# Patient Record
Sex: FEMALE | Race: WHITE | Employment: PART TIME | URBAN - METROPOLITAN AREA
[De-identification: names, ages, dates, MRNs, and addresses within clinical notes are randomized per-mention and may not be internally consistent; named-entity substitution may affect disease eponyms.]

---

## 2022-08-31 ENCOUNTER — OFFICE VISIT (OUTPATIENT)
Dept: ORTHOPEDIC SURGERY | Age: 32
End: 2022-08-31
Payer: COMMERCIAL

## 2022-08-31 VITALS — BODY MASS INDEX: 23.3 KG/M2 | HEIGHT: 66 IN | WEIGHT: 145 LBS

## 2022-08-31 DIAGNOSIS — M25.511 ACUTE PAIN OF RIGHT SHOULDER: ICD-10-CM

## 2022-08-31 DIAGNOSIS — S43.431A TEAR OF RIGHT GLENOID LABRUM, INITIAL ENCOUNTER: Primary | ICD-10-CM

## 2022-08-31 PROCEDURE — G8420 CALC BMI NORM PARAMETERS: HCPCS | Performed by: PHYSICIAN ASSISTANT

## 2022-08-31 PROCEDURE — 4004F PT TOBACCO SCREEN RCVD TLK: CPT | Performed by: PHYSICIAN ASSISTANT

## 2022-08-31 PROCEDURE — G8427 DOCREV CUR MEDS BY ELIG CLIN: HCPCS | Performed by: PHYSICIAN ASSISTANT

## 2022-08-31 PROCEDURE — 99203 OFFICE O/P NEW LOW 30 MIN: CPT | Performed by: PHYSICIAN ASSISTANT

## 2022-08-31 RX ORDER — ACETAMINOPHEN AND CODEINE PHOSPHATE 120; 12 MG/5ML; MG/5ML
SOLUTION ORAL
COMMUNITY
Start: 2022-08-29

## 2022-08-31 NOTE — PROGRESS NOTES
Date of Encounter: 8/31/2022  Patient Zoraida Jimenes Record Number: [id number]  YOB: 1990    Chief Complaint   Patient presents with    Shoulder Pain     RIght shoulder       History of Present Illness:  Nicolas Neff is a 34 y.o. female here for evaluation of her right shoulder. Her pain assessment is documented below and I reviewed this with her today. 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  She states about 2 months ago she began having right shoulder pain after a game. She states since that time she has had somewhat severe right shoulder pain every time she goes to throw a ball. She denies pain at rest.  She denies pain with gentle range of motion activities. She states the only time she really has pain is with throwing. She denies swelling, redness or warmth of the right shoulder. She has had no previous injury to the shoulder. She is right-handed. Pain Assessment  Location of Pain: Shoulder  Location Modifiers: Right, Superior, Posterior  Severity of Pain: 9  Quality of Pain: Sharp, Aching  Duration of Pain: A few minutes  Frequency of Pain: Other (Comment) (Only when throwing in softball)  Date Pain First Started:  (1.5 months ago)  Aggravating Factors: Other (Comment) (throwing softball)  Result of Injury: No  Work-Related Injury: No  Are there other pain locations you wish to document?: No    Past Medical History:   Diagnosis Date    Concussion        Past Surgical History:   Procedure Laterality Date    APPENDECTOMY      HERNIA REPAIR      KNEE SURGERY         Current Outpatient Medications   Medication Sig Dispense Refill    norethindrone (MICRONOR) 0.35 MG tablet        No current facility-administered medications for this visit. Allergies, social and family histories were reviewed and updated as appropriate.     Review of Systems:  Relevant review of systems reviewed and available in the patient's chart under the 'MEDIA' tab.    Vital Signs:  Ht 5' 6\" (1.676 m)   Wt 145 lb (65.8 kg)   BMI 23.40 kg/m²     General Exam:   Constitutional: Patient is adequately groomed with no evidence of malnutrition  Mental Status: The patient is oriented to time, place and person. The patient's mood and affect are appropriate. Lymphatic: The lymphatic examination bilaterally reveals all areas to be without enlargement or induration. Neurological: The patient has good coordination and balance. There is no focal weakness or sensory deficit. Right Shoulder Examination:    Inspection:  No ptosis and normal deltoid contour. No gross atrophy in any particular myotome. There is no obvious swelling or joint effusion of the shoulder. There are no abrasions, lacerations, contusions, hematomas or ecchymosis. There is no erythema, induration or warmth to suggest an infectious process. Palpation: Patient denies tenderness on palpation of the right shoulder    Range of Motion: Forward flexion: 180°    Abduction: 150°   External Rotation with elbow at Side: 90°   Internal rotation: T5    Strength: Forward flexion: 5 / 5                   Abduction: 5 / 5        Internal Rotation: 5 / 5        External Rotation with elbow at side: 5 / 5        Shoulder Shru/5    Special Tests:      Neer's impingement is negative  Menard's maneuver is negative  Empty can testing is positive  Hair's test is negative  Drop arm sign is negative   Sulcus sign: negative  No signs of any significant multidirectional instability. Elbow and wrist range of motion full with strength 5/5 on elbow flexion and extension, 5/5 on wrist flexion and extension.  strength is 5/5. Hand intrinsic function intact. Sensation light touch grossly present throughout the axillary, median, radial, ulnar, muscular cutaneous nerve distributions.     Cervical spine: No tenderness over the spinous processes or step-offs, normal flexion and extension, lateral rotation and bending without increasing pain or radicular symptoms. Spurling's sign negative. Skin: There are no rashes, ulcerations or lesions. Sensation to light touch intact. Circulation: The limb is warm and well perfused. Distal pulses intact. Capillary refill is intact. Edema: none. Venous stasis changes: none. Radiology:     X-rays obtained and reviewed in office:  Views: 3 (AP, Y, Axillary) right shoulder  Impression: No evidence for acute fracture or dislocation. No lytic or blastic areas in the proximal humeral metaphysis / diaphysis. Acromioclavicular and glenohumeral joints show no significant loss of joint space    Orders:  Orders Placed This Encounter   Procedures    XR SHOULDER RIGHT (MIN 2 VIEWS)     Standing Status:   Future     Number of Occurrences:   1     Standing Expiration Date:   9/30/2022    MRI SHOULDER RIGHT WO CONTRAST     Vickie Mcrae     Standing Status:   Future     Standing Expiration Date:   8/31/2023       Impression:   Diagnosis Orders   1. Tear of right glenoid labrum, initial encounter  MRI SHOULDER RIGHT WO CONTRAST      2. Acute pain of right shoulder  XR SHOULDER RIGHT (MIN 2 VIEWS)          Treatment Plan:          I am concerned for glenoid labrum pathology. X-rays are unremarkable. Patient has been trying rest, ice, heat, anti-inflammatories and Tylenol. She has been working with a  doing exercises and stretches without relief of her pain. I feel it would be best to further evaluate the right shoulder with MRI. Advised patient I would review MRI once completed and refer if appropriate. Renuka Anton was informed of the results of any imaging. We discussed treatment options and a time was given to answer questions. A plan was proposed and Renuka Anton understand and accepts this course of care.           Electronically signed by Alyce Son PA-C on 0/99/1370  Board Certified [facility]    Please note that portions of this dictation was performed with a voice recognition program ([employer] [place] [place] ([employer] All efforts were made to edit the dictation but occasionally words are mis-transcribed. It is possible that there are still dictated errors within this office note.   If so, please bring any errors to my attention for an addendum

## 2022-10-10 ENCOUNTER — HOSPITAL ENCOUNTER (OUTPATIENT)
Dept: MRI IMAGING | Age: 32
Discharge: HOME | End: 2022-10-10
Payer: COMMERCIAL

## 2022-10-10 DIAGNOSIS — S43.431A TEAR OF RIGHT GLENOID LABRUM, INITIAL ENCOUNTER: ICD-10-CM

## 2022-10-10 PROCEDURE — 73221 MRI JOINT UPR EXTREM W/O DYE: CPT

## 2022-10-17 ENCOUNTER — TELEPHONE (OUTPATIENT)
Dept: ORTHOPEDIC SURGERY | Age: 32
End: 2022-10-17

## 2022-10-17 NOTE — TELEPHONE ENCOUNTER
Can you help patient schedule an appointment with Dr. Brie Neri to discuss MRI results and treatment options?  Thanks

## 2022-10-17 NOTE — TELEPHONE ENCOUNTER
Called patient 2nd time and left message Ronandionisio Sayra would like her to see Dr Bea Calvo for MRI results -- left # for patient to call back and schedule appt with Dr Bea Calvo

## 2022-10-21 ENCOUNTER — OFFICE VISIT (OUTPATIENT)
Dept: ORTHOPEDIC SURGERY | Age: 32
End: 2022-10-21
Payer: COMMERCIAL

## 2022-10-21 VITALS — HEIGHT: 66 IN | WEIGHT: 145 LBS | BODY MASS INDEX: 23.3 KG/M2

## 2022-10-21 DIAGNOSIS — M75.41 INTERNAL IMPINGEMENT OF RIGHT SHOULDER: Primary | ICD-10-CM

## 2022-10-21 DIAGNOSIS — S43.431A DEGENERATIVE SUPERIOR LABRAL ANTERIOR-TO-POSTERIOR (SLAP) TEAR OF RIGHT SHOULDER: ICD-10-CM

## 2022-10-21 DIAGNOSIS — M75.111 NONTRAUMATIC INCOMPLETE TEAR OF RIGHT ROTATOR CUFF: ICD-10-CM

## 2022-10-21 PROCEDURE — G8484 FLU IMMUNIZE NO ADMIN: HCPCS | Performed by: ORTHOPAEDIC SURGERY

## 2022-10-21 PROCEDURE — G8420 CALC BMI NORM PARAMETERS: HCPCS | Performed by: ORTHOPAEDIC SURGERY

## 2022-10-21 PROCEDURE — 99204 OFFICE O/P NEW MOD 45 MIN: CPT | Performed by: ORTHOPAEDIC SURGERY

## 2022-10-21 PROCEDURE — 4004F PT TOBACCO SCREEN RCVD TLK: CPT | Performed by: ORTHOPAEDIC SURGERY

## 2022-10-21 PROCEDURE — G8427 DOCREV CUR MEDS BY ELIG CLIN: HCPCS | Performed by: ORTHOPAEDIC SURGERY

## 2022-10-21 NOTE — PROGRESS NOTES
ORTHOPAEDIC OFFICE NOTE    Chief Complaint   Patient presents with    Shoulder Pain     right       HPI  10/21/22 initial ortho surgeon E&M visit  34 y.o. 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 Pain Assessment  Location of Pain: Shoulder  Location Modifiers: Right  Severity of Pain: 0  Quality of Pain: Sharp, Aching      Review of Systems  I have read over the ROS from the Patient History Form dated on 8/31/22  Pertinent positives include change  Rest of 13 point ROS otherwise negative except per HPI, and scanned into the patient's chart under the Media tab. No Known Allergies     Current Outpatient Medications   Medication Sig Dispense Refill    norethindrone (MICRONOR) 0.35 MG tablet        No current facility-administered medications for this visit. Past Medical History:   Diagnosis Date    Concussion         Past Surgical History:   Procedure Laterality Date    APPENDECTOMY      HERNIA REPAIR      KNEE SURGERY         No family history on file.     Social History     Socioeconomic History    Marital status: Single     Spouse name: Not on file    Number of children: Not on file    Years of education: Not on file    Highest education level: Not on file   Occupational History    Not on file   Tobacco Use    Smoking status: Every Day     Packs/day: 0.50     Types: Cigarettes    Smokeless tobacco: Never Vaping Use    Vaping Use: Never used   Substance and Sexual Activity    Alcohol use: Yes     Comment: one per month    Drug use: No    Sexual activity: Not on file   Other Topics Concern    Not on file   Social History Narrative    Not on file     Social Determinants of Health     Financial Resource Strain: Not on file   Food Insecurity: Not on file   Transportation Needs: Not on file   Physical Activity: Not on file   Stress: Not on file   Social Connections: Not on file   Intimate Partner Violence: Not on file   Housing Stability: Not on file        Vitals:    10/21/22 0932   Weight: 145 lb (65.8 kg)   Height: 5' 6\" (1.676 m)       Physical Exam  Constitutional - well-groomed, well-nourished, Body mass index is 23.4 kg/m². Psychiatric - pleasant, normal mood & affect  Cardiovascular - RRR, negative UE peripheral edema, radial pulse 2+  Respiratory - respirations unlabored, on room air; mask on  Skin - no rashes, wounds, or lesions seen on exposed skin  Neck - no radicular pain with Spurling's test.  full ROM, no TTP of spinous processes, no TTP of paraspinal musculature  Neurological - RUE SILT M/U/R/A/LABC nerve distributions; AIN/PIN/IO intact  Right shoulder:   No obvious deformity/swelling/ecchymosis   No atrophy seen    No TTP     Range of Motion:     Forward flexion/scaption:  180    Abduction:  180    External rotation with arm at side:  80    Internal rotation:  T4   Strength:    Abduction:  5/5     External rotation:  5/5    Special tests:    negative lift-off sign    negative belly-press test    equivocal Hawkin's test    Positive [place]'s test    No evidence of scapular winging      Imaging:  Images were personally reviewed by myself and discussed with the patient  Narrative   EXAMINATION:   MRI OF THE RIGHT SHOULDER WITHOUT CONTRAST   10/10/2022 2:10 pm       TECHNIQUE:   Multiplanar multisequence MRI of the right shoulder was performed without the   administration of intravenous contrast. COMPARISON:   Right shoulder plain radiographs from 08/31/2022. HISTORY:   ORDERING SYSTEM PROVIDED HISTORY: Tear of right glenoid labrum, initial   encounter   TECHNOLOGIST PROVIDED HISTORY: [facility]   Reason for Exam: Right shoulder pain, LRM       28-year-old female with right shoulder pain and limited range of motion; tear   of right glenoid labrum. FINDINGS:   ROTATOR CUFF: Trace fluid in the subacromial/subdeltoid bursa. Shallow multifocal low-grade partial-thickness interstitial and   articular-surface tearing of posterior supraspinatus and anterior superior   infraspinatus between critical zone and footplate with mild-to-moderate   underlying supraspinatus and infraspinatus tendinosis. Subscapularis and teres minor muscle/tendon appears grossly intact without   evidence of tearing. No significant atrophy or fatty degeneration of the visualized rotator cuff   musculature. BICEPS TENDON: Long head of biceps tendon properly located in the bicipital   groove and seen extending to the biceps labral anchor. LABRUM: Mild diffuse labral degeneration. No paralabral cyst formation. GLENOHUMERAL JOINT: Glenohumeral articular cartilage is preserved. Inferior   glenohumeral ligament appears intact. No sizable glenohumeral joint effusion. AC JOINT AND ACROMIOCLAVICULAR ARCH: Right AC joint grossly unremarkable. Type 2 acromion. BONE MARROW: Subcortical cystic changes and marrow edema at the lateral   humeral head. Bone marrow signal intensity within the visualized osseous   structures otherwise within normal limits. No acute fracture or dislocation   involving the osseous components of the shoulder. OUTLET SPACES: Suprascapular notch and quadratus space grossly unremarkable   in appearance. No right axillary lymphadenopathy. Impression   1.  Shallow multifocal low-grade partial-thickness interstitial and   articular-surface tearing of posterior supraspinatus and anterior superior   infraspinatus between critical zone and footplate. Mild-to-moderate   underlying supraspinatus and infraspinatus tendinosis. 2. Mild diffuse labral degeneration. 3. No acute osseous abnormality. Prior right shoulder plain radiographs from August 31 were reviewed as well, radiographs within normal limits      Assessment & Plan:  34 y.o. female who presents with    Diagnosis Orders   1. Internal impingement of right shoulder  [facility]      2. Nontraumatic incomplete articular sided tear of right rotator cuff  [facility]      3. Degenerative superior labral anterior-to-posterior (SLAP) tear of right shoulder  [facility]          No orders of the defined types were placed in this encounter.       Pathoanatomy of current condition discussed  Internal impingement seen in overhead athletes, throwers  Undersurface of the rotator cuff impinges against the superior labrum  Resulting in changes seen on MRI involving of the articular fibers of the rotator cuff and the superior labrum  Also the cystic changes seen over the posterolateral humeral head  These are chronic changes c/w internal impingement and has likely been present for over 3 months  It only bothers her during throwing at this time    As she is currently in her off season, lets get her into physical therapy for shoulder exercises, capsular stretching, proper throwing mechanics, strengthening work  Ice, Tylenol/NSAIDs as needed  Defer injection  Long-term maintenance of shoulder exercise program to prevent future issues    If symptoms persist/worsen, she is advised to return     Talia Thakkar MD